# Patient Record
Sex: MALE | Race: BLACK OR AFRICAN AMERICAN | NOT HISPANIC OR LATINO | Employment: FULL TIME | URBAN - NONMETROPOLITAN AREA
[De-identification: names, ages, dates, MRNs, and addresses within clinical notes are randomized per-mention and may not be internally consistent; named-entity substitution may affect disease eponyms.]

---

## 2024-04-17 ENCOUNTER — APPOINTMENT (OUTPATIENT)
Dept: GENERAL RADIOLOGY | Facility: HOSPITAL | Age: 34
End: 2024-04-17
Payer: OTHER MISCELLANEOUS

## 2024-04-17 ENCOUNTER — HOSPITAL ENCOUNTER (EMERGENCY)
Facility: HOSPITAL | Age: 34
Discharge: HOME | End: 2024-04-17
Payer: OTHER MISCELLANEOUS

## 2024-04-17 ENCOUNTER — APPOINTMENT (OUTPATIENT)
Dept: CT IMAGING | Facility: HOSPITAL | Age: 34
End: 2024-04-17
Payer: OTHER MISCELLANEOUS

## 2024-04-17 VITALS
DIASTOLIC BLOOD PRESSURE: 83 MMHG | HEIGHT: 68 IN | TEMPERATURE: 98.5 F | BODY MASS INDEX: 29.25 KG/M2 | WEIGHT: 193 LBS | OXYGEN SATURATION: 100 % | SYSTOLIC BLOOD PRESSURE: 112 MMHG | HEART RATE: 78 BPM | RESPIRATION RATE: 14 BRPM

## 2024-04-17 DIAGNOSIS — S30.0XXA SACRAL CONTUSION, INITIAL ENCOUNTER: ICD-10-CM

## 2024-04-17 DIAGNOSIS — M25.512 ACUTE PAIN OF LEFT SHOULDER: ICD-10-CM
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
## 2024-04-17 DIAGNOSIS — S00.03XA CONTUSION OF SCALP, INITIAL ENCOUNTER: ICD-10-CM

## 2024-04-17 PROCEDURE — 70450 CT HEAD/BRAIN W/O DYE: CPT

## 2024-04-17 PROCEDURE — 72220 X-RAY EXAM SACRUM TAILBONE: CPT

## 2024-04-17 PROCEDURE — 73010 X-RAY EXAM OF SHOULDER BLADE: CPT

## 2024-04-17 PROCEDURE — 72110 X-RAY EXAM L-2 SPINE 4/>VWS: CPT

## 2024-04-17 PROCEDURE — 99284 EMERGENCY DEPT VISIT MOD MDM: CPT

## 2024-04-17 RX ORDER — TRAMADOL HYDROCHLORIDE 50 MG/1
50 TABLET ORAL ONCE
Status: COMPLETED | OUTPATIENT
Start: 2024-04-17 | End: 2024-04-17

## 2024-04-17 RX ORDER — IBUPROFEN 800 MG/1
800 TABLET ORAL EVERY 8 HOURS PRN
Qty: 21 TABLET | Refills: 0 | Status: SHIPPED | OUTPATIENT
Start: 2024-04-17

## 2024-04-17 RX ADMIN — TRAMADOL HYDROCHLORIDE 50 MG: 50 TABLET ORAL at 11:46

## 2024-04-17 NOTE — Clinical Note
[facility] EMERGENCY DEPARTMENT  [address] [place] [address] AVE  [facility] [address]  Phone: [phone]    Risa Stearns was seen and treated in our emergency department on 4/17/2024.  He may return to work on 04/18/2024.         Thank you for choosing [facility].    Malachi Bates RN      
36.5

## 2024-04-17 NOTE — ED PROVIDER NOTES
Subjective   History of Present Illness  Patient is a 35 y/o male who presents to the ED c/o pain following a fall. 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 He said he fell about 5 feet total, and landed on his backside. He also notes hitting his L shoulder and head on the ground. Notes pain in the sacrum, posterior head, and posterior left shoulder. Denies LOC or any other symptoms at this time. Took Aleve PTA.         Review of Systems   Musculoskeletal:  Positive for back pain and myalgias.   Neurological:  Positive for headaches.   All other systems reviewed and are negative.      History reviewed. No pertinent past medical history.    No Known Allergies    History reviewed. No pertinent surgical history.    History reviewed. No pertinent family history.    Social History     Socioeconomic History    Marital status: Single   Tobacco Use    Smoking status: Never    Smokeless tobacco: Never   Vaping Use    Vaping status: Never Used           Objective   Physical Exam  Vitals and nursing note reviewed.   Constitutional:       Appearance: Normal appearance. He is normal weight. He is not ill-appearing or toxic-appearing.   HENT:      Head: Normocephalic and atraumatic.      Jaw: Tenderness present.        Mouth/Throat:      Mouth: Mucous membranes are moist. Mucous membranes are dry.      Pharynx: Oropharynx is clear.   Eyes:      Extraocular Movements: Extraocular movements intact.      Conjunctiva/sclera: Conjunctivae normal.      Pupils: Pupils are equal, round, and reactive to light.   Cardiovascular:      Rate and Rhythm: Normal rate and regular rhythm.      Pulses: Normal pulses.      Heart sounds: Normal heart sounds.   Pulmonary:      Effort: Pulmonary effort is normal.      Breath sounds: Normal breath sounds.   Abdominal:      General: Abdomen is flat.      Palpations: Abdomen is soft.   Musculoskeletal:      Left shoulder: Tenderness and bony  tenderness present. No swelling, deformity, laceration or crepitus. Normal range of motion.        Arms:       Cervical back: Normal range of motion and neck supple. No tenderness.   Skin:     General: Skin is dry.   Neurological:      General: No focal deficit present.      Mental Status: He is alert and oriented to person, place, and time. Mental status is at baseline.      Cranial Nerves: No cranial nerve deficit.      Sensory: No sensory deficit.      Motor: No weakness.      Gait: Gait normal.   Psychiatric:         Mood and Affect: Mood normal.         Behavior: Behavior normal.         Procedures           ED Course                                             Medical Decision Making  Patient is a 33 y/o male who presents to the ED c/o pain following a fall. 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 He said he fell about 5 feet total, and landed on his backside. He also notes hitting his L shoulder and head on the ground. Notes pain in the sacrum, posterior head, and posterior left shoulder. Denies LOC or any other symptoms at this time. Took Aleve PTA.     Differential Dx: Abrasion, sacral contusion, sacral fracture, scapula fracture, scapula contusion, head contusion, CVA.     XR Scapula Left   Final Result    1. Unremarkable exam.         This report was signed and finalized on 4/17/2024 11:27 AM by Horacio Leong.          XR Spine Lumbar Complete 4+VW   Final Result         1. Facet arthropathy at L5-S1. No fracture is visualized.         This report was signed and finalized on 4/17/2024 11:28 AM by Horacio Leong.          XR Sacrum & Coccyx   Final Result         1. Unremarkable exam.         This report was signed and finalized on 4/17/2024 11:26 AM by Horacio Leong.          CT Head Without Contrast   Final Result    1. No acute intracranial abnormality.    2. No skull fracture.                                                                 This report was signed and finalized on 4/17/2024 11:04 AM by Dr. Janet Barrera MD.          Discussed results with patient. Will discharge with diagnosis of contusions and write for NSAID medication for pain.     Amount and/or Complexity of Data Reviewed  Radiology: ordered.        Final diagnoses:   None       ED Disposition  ED Disposition       None            No follow-up provider specified.       Medication List      No changes were made to your prescriptions during this visit.            Markus Thornton PA-C  04/17/24 1139       Markus Thornton PA-C  04/17/24 1141

## 2024-04-17 NOTE — DISCHARGE INSTRUCTIONS
Please return to the ED if you have worsening symptoms, or new symptoms such as slurred speech, facial drooping, numbness or tingling on one side of the body, or weakness in the legs/arms.